# Patient Record
Sex: FEMALE | Race: OTHER | HISPANIC OR LATINO | URBAN - METROPOLITAN AREA
[De-identification: names, ages, dates, MRNs, and addresses within clinical notes are randomized per-mention and may not be internally consistent; named-entity substitution may affect disease eponyms.]

---

## 2019-05-10 ENCOUNTER — EMERGENCY (EMERGENCY)
Facility: HOSPITAL | Age: 39
LOS: 1 days | Discharge: HOME | End: 2019-05-10
Attending: EMERGENCY MEDICINE
Payer: COMMERCIAL

## 2019-05-10 VITALS
TEMPERATURE: 97 F | RESPIRATION RATE: 16 BRPM | DIASTOLIC BLOOD PRESSURE: 89 MMHG | OXYGEN SATURATION: 99 % | HEART RATE: 78 BPM | HEIGHT: 66 IN | SYSTOLIC BLOOD PRESSURE: 129 MMHG | WEIGHT: 166.01 LBS

## 2019-05-10 LAB
APPEARANCE UR: CLEAR — SIGNIFICANT CHANGE UP
BACTERIA # UR AUTO: ABNORMAL /HPF
BILIRUB UR-MCNC: NEGATIVE — SIGNIFICANT CHANGE UP
COLOR SPEC: YELLOW — SIGNIFICANT CHANGE UP
DIFF PNL FLD: ABNORMAL
EPI CELLS # UR: SIGNIFICANT CHANGE UP /HPF
GLUCOSE UR QL: NEGATIVE — SIGNIFICANT CHANGE UP
HCG UR QL: NEGATIVE — SIGNIFICANT CHANGE UP
KETONES UR-MCNC: NEGATIVE — SIGNIFICANT CHANGE UP
LEUKOCYTE ESTERASE UR-ACNC: NEGATIVE — SIGNIFICANT CHANGE UP
NITRITE UR-MCNC: NEGATIVE — SIGNIFICANT CHANGE UP
PH UR: 6 — SIGNIFICANT CHANGE UP (ref 5–8)
PROT UR-MCNC: NEGATIVE — SIGNIFICANT CHANGE UP
RBC CASTS # UR COMP ASSIST: ABNORMAL /HPF (ref 0–2)
SP GR SPEC: 1 — LOW (ref 1.01–1.02)
UROBILINOGEN FLD QL: NEGATIVE — SIGNIFICANT CHANGE UP
WBC UR QL: SIGNIFICANT CHANGE UP /HPF (ref 0–5)

## 2019-05-10 PROCEDURE — 99283 EMERGENCY DEPT VISIT LOW MDM: CPT

## 2019-05-10 PROCEDURE — 81001 URINALYSIS AUTO W/SCOPE: CPT

## 2019-05-10 PROCEDURE — 81025 URINE PREGNANCY TEST: CPT

## 2019-05-10 RX ORDER — IBUPROFEN 200 MG
1 TABLET ORAL
Qty: 6 | Refills: 0
Start: 2019-05-10 | End: 2019-05-11

## 2019-05-10 RX ORDER — OXYCODONE AND ACETAMINOPHEN 5; 325 MG/1; MG/1
1 TABLET ORAL ONCE
Refills: 0 | Status: DISCONTINUED | OUTPATIENT
Start: 2019-05-10 | End: 2019-05-10

## 2019-05-10 RX ORDER — DIAZEPAM 5 MG
2 TABLET ORAL ONCE
Refills: 0 | Status: DISCONTINUED | OUTPATIENT
Start: 2019-05-10 | End: 2019-05-10

## 2019-05-10 RX ORDER — CYCLOBENZAPRINE HYDROCHLORIDE 10 MG/1
1 TABLET, FILM COATED ORAL
Qty: 3 | Refills: 0
Start: 2019-05-10

## 2019-05-10 RX ORDER — IBUPROFEN 200 MG
600 TABLET ORAL ONCE
Refills: 0 | Status: COMPLETED | OUTPATIENT
Start: 2019-05-10 | End: 2019-05-10

## 2019-05-10 RX ADMIN — Medication 2 MILLIGRAM(S): at 17:34

## 2019-05-10 RX ADMIN — Medication 600 MILLIGRAM(S): at 17:22

## 2019-05-10 NOTE — ED PROVIDER NOTE - CLINICAL SUMMARY MEDICAL DECISION MAKING FREE TEXT BOX
39 y/o F presents to the ED with b/l lower back pain, nausea, and burning with urination x a few days. Will check urine, analgesia, and reassess. 37 y/o F presents to the ED with b/l lower back pain.  No n/v, no abdominal si/sx, no urianry sisx.   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     pt's urine noted and pt does have vag spotting.  Patient's urine shows no infection.  pt's sis/x seem more consistent with muscular pain.  Will dc with PMD f/u and advise for return

## 2019-05-10 NOTE — ED ADULT NURSE NOTE - OBJECTIVE STATEMENT
c/o low back pain for a couple of days. Denies fall or injury. Ambulating with steady gait.Refused repeat vs upon discharge

## 2019-05-10 NOTE — ED PROVIDER NOTE - PHYSICAL EXAMINATION
back:  No C/T/L spine tenderness, no step offs, NO CVA TENDERNESS,  Negative striaght leg test.   no ab pain, no r/g/r.

## 2019-05-10 NOTE — ED PROVIDER NOTE - OBJECTIVE STATEMENT
39 y/o F with no significant PMHx and no significant PSHX presents to the ED with c/o b/l lower back pain that radiates to the abd, nausea, and burning with urination x a few days that is worse when bending x 2 days. Pt states there was no trigger for the pain. Pt notes she has been spotting recently. Pt denies vomiting, urinary frequency, urinary urgency, vaginal sxs, chest pain, SOB, or any other complaints. NKDA. 37 y/o F with no significant PMHx and no significant PSHX presents to the ED with c/o b/l lower back pain tghat's left and right sided back.  with no radiation to the abdomen.  No N/V/D/C .  No urinary si.sx.  states that her sisx are going on for a few days that is worse when bending x 2 days. Pt states there was no trigger for the pain. Pt notes she has been spotting recently. Pt denies vomiting, urinary frequency, urinary urgency, vaginal sxs, chest pain, SOB, or any other complaints. NKDA.